# Patient Record
Sex: FEMALE | Race: ASIAN | Employment: PART TIME | URBAN - METROPOLITAN AREA
[De-identification: names, ages, dates, MRNs, and addresses within clinical notes are randomized per-mention and may not be internally consistent; named-entity substitution may affect disease eponyms.]

---

## 2019-06-03 ENCOUNTER — HOSPITAL ENCOUNTER (OUTPATIENT)
Dept: PHYSICAL THERAPY | Age: 54
Discharge: HOME | End: 2019-06-03
Payer: COMMERCIAL

## 2019-06-03 PROCEDURE — 97161 PT EVAL LOW COMPLEX 20 MIN: CPT | Performed by: PHYSICAL THERAPIST

## 2019-06-03 PROCEDURE — 97110 THERAPEUTIC EXERCISES: CPT | Performed by: PHYSICAL THERAPIST

## 2019-06-03 NOTE — PROGRESS NOTES
[facility] at [address] [place] [address] Drive: [phone]   Fax: [phone]  PLAN OF CARE / STATEMENT OF MEDICAL NECESSITY FOR PHYSICAL THERAPY SERVICES  Patient Name: Wagner Taylor : 1965   Medical   Diagnosis: Pain in right shoulder [M25.511] Treatment Diagnosis: Right shoulder pain   Onset Date: 2-3months ago     Referral Source: Shelley Xie MD Start of Care [facility]): 6/3/2019   Prior Hospitalization: See medical history Provider #: [id number]   Prior Level of Function: sedentary   Comorbidities: DMII, hypothyroidism   Medications: Verified on Patient Summary List   The Plan of Care and following information is based on the information from the initial evaluation.   ===========================================================================================  Assessment / gao information:  Wagner Taylor is a 62 y.o.  yo female presents with signs/symtoms of right frozen shoulder. Differential diagnosis includes RC tendinopathy, RC tear. Minimal to no response to isometrics for pain reduction/movement improvement.     Patient will continue to benefit from skilled PT services to modify and progress therapeutic interventions, address functional mobility deficits, address ROM deficits, address strength deficits, analyze and address soft tissue restrictions, analyze and cue movement patterns, analyze and modify body mechanics/ergonomics and assess and modify postural abnormalities to attain remaining goals.     Pt instructed in HEP and will f/u in clinic for PT.  ===========================================================================================  Eval Complexity: History MEDIUM  Complexity : 1-2 comorbidities / personal factors will impact the outcome/ POC ;  Examination  MEDIUM Complexity : 3 Standardized tests and measures addressing body structure, function, activity limitation and / or participation in recreation ; Presentation LOW Complexity : Stable, uncomplicated ;  Decision Making MEDIUM Complexity : FOTO score of 26-74; Overall Complexity LOW   Problem List: pain affecting function, decrease ROM, decrease strength, edema affecting function, impaired gait/ balance, decrease ADL/ functional abilitiies, decrease activity tolerance and decrease flexibility/ joint mobility   Treatment Plan may include any combination of the following: Therapeutic exercise, Therapeutic activities, Neuromuscular re-education, Physical agent/modality, Gait/balance training, Manual therapy, Patient education, Self Care training and Functional mobility training  Patient / Family readiness to learn indicated by: asking questions, trying to perform skills and interest  Persons(s) to be included in education: patient (P)  Barriers to Learning/Limitations: no  Patient Goal (s): No pain, better motion   Patient self reported health status: good  Rehabilitation Potential: fair  Goals:    Short Term Goals: To be accomplished in 2 weeks:   Patient will report compliance with HEP at least 1x/day to aid in rehabilitation program.   Status at IE: NA   Current: Same as IE     Patient will display pain free AROM into 0-130 to aid in completion of ADLs. Status at IE: pain to 130   Current: Same as IE      Long Term Goals: To be accomplished in 6 weeks:   Patient will report compliance with HEP a least 3-4x/week to aid in rehabilitation/strengthening program.   Status at IE: NA   Current: Same as IE     Patient will report no pain greater than 1-2/10 with overhead activities to aid in completion of ADLs. Status at IE: 2-8/10   Current: Same as IE     Patient will increase B UE strength to 5/5 throughout all planes to aid in completion of ADLs. Status at IE: varies, 4-/5   Current: Same as IE     Patient will increase FOTO score to 73 points overall to demonstrate improvement in functional status.     Status at IE: 77   Current: Same as IE        Frequency / Duration:   Patient to be seen 1 times per week for 8  weeks:  Patient / Caregiver education and instruction: self care and exercises      . Therapist Signature: Josh Linares DPT, OCS Date: 3/1/4915   Certification Period: NA Time: 10:02 AM   ===========================================================================================  I certify that the above Physical Therapy Services are being furnished while the patient is under my care. I agree with the treatment plan and certify that this therapy is necessary. Physician Signature:        Date:       Time:     Please sign and return to [facility] or you may fax the signed copy to [phone]. Thank you.

## 2019-06-03 NOTE — PROGRESS NOTES
PT DAILY TREATMENT NOTE/SHOULDER EVAL 10-18    Patient Name: Cleo Patel  Date:6/3/2019  : 1965  [x]  Patient  Verified  Payor: Ever Rodriguez / Plan: Felisa Tavera / Product Type: HMO /    In time:9:00  Out time:9:45  Total Treatment Time (min): 45  Visit #: 1 of 6    Medicare/BCBS Only   Total Timed Codes (min):  25 1:1 Treatment Time:  45       Treatment Area: Pain in right shoulder [M25.511]    SUBJECTIVE  Pain Level (0-10 scale): 2  []constant []intermittent []improving []worsening []no change since onset    Any medication changes, allergies to medications, adverse drug reactions, diagnosis change, or new procedure performed?: [x] No    [] Yes (see summary sheet for update)  Subjective functional status/changes:     Patient has c/c of right shoulder pain since 2-3months ago. MALOU: uncertain. Patient describes pain as sharp with movement, dull otherwise constant. .No diurnal features. Denies numbness/tingling. Denies popping/clicking. Aggravating factors: jerky movement, lifting, raising overhead. Alleviating factors: advil (on cccasion). Denies red flags: SOB, chest pain, dizziness/lightheadedness, blurred/double vision, HA, chills/fevers, night sweats, change in bowel/bladder control, abdominal pain, difficulty swallowing, slurred speech, unexplained weight gain/loss, nausea, vomiting. PMHx: DMII, hypothyroidism. Surgical Hx: unremarkable. Social Hx:  home, denies alcohol & tobacco, work status: . PLOF: sedentary, house work. CLOF: same.   Diagnostic Imaging: x-ray: unremarkable      OBJECTIVE/EXAMINATION    20 min [x]Eval                  []Re-Eval       25 min Therapeutic Exercise:  [x] See flow sheet :   Rationale: increase ROM, increase strength and decrease pain to improve the patients ability to complete ADLs            With   [] TE   [] TA   [] neuro   [] other: Patient Education: [x] Review HEP    [] Progressed/Changed HEP based on:   [] positioning   [] body mechanics   [] transfers [] heat/ice application    [] other:        Physical Therapy Evaluation - Shoulder    Observation: unremarkable  Posture: [] Poor    [x] Fair    [] Good    Describe:    ROM:  [] Unable to assess at this time                                           AROM                                                              PROM   Left Right  Left Right   Flexion 180 130compensation Flexion  90   Extension 70 45 Extension     Scaption/ 85 Scaptin/ABD  85   ER @ 0 Degrees   ER @ 0 Degrees     Apley IR T12 Posterior iliac crest ER @ 90 Degrees  30   Apley ER   IR @ 90 Degrees  15     End Feel / Painful Arc:    UE Strength:   [] Unable to assess at this time                                                                            L (1-5) R (1-5) Pain   Flexion 4 4- [] Yes   [] No   Abduction 4+ 3- [] Yes   [] No   ER 4 4- [] Yes   [] No   IR 5 4+ [] Yes   [] No   Extension 5 5 [] Yes   [] No   Elbow flexion 4+ 4 [] Yes   [] No   Elbow Ext 4+ 4 [] Yes   [] No   Wrist Flexion 5 5 [] Yes   [] No   Wrist Extension 5 5 [] Yes   [] No   Thumb extension 4 4 [] Yes   [] No   Finger Abduction 4 4 [] Yes   [] No       Scapulohumoral Control / Rhythm:  Able to eccentrically lower with good control? Left: [] Yes   [] No     Right: [] Yes   [] No    Accessory Motions:    Palpation  [] Min  [] Mod  [] Severe    Location:  [] Min  [] Mod  [] Severe    Location:      Other Tests / Comments:        Pain Level (0-10 scale) post treatment: 2     ASSESSMENT/Changes in Function: Patient presents with signs/symtoms of right frozen shoulder. Differential diagnosis includes RC tendinopathy, RC tear. Minimal to no response to isometrics for pain reduction/movement improvement.     Patient will continue to benefit from skilled PT services to modify and progress therapeutic interventions, address functional mobility deficits, address ROM deficits, address strength deficits, analyze and address soft tissue restrictions, analyze and cue movement patterns, analyze and modify body mechanics/ergonomics and assess and modify postural abnormalities to attain remaining goals.      [x]  See Plan of Care  []  See progress note/recertification  []  See Discharge Summary         Progress towards goals / Updated goals:  See POC    PLAN  []  Upgrade activities as tolerated     [x]  Continue plan of care  []  Update interventions per flow sheet       []  Discharge due to:_  []  Other:_      Tanesha Damon PT, DPT 6/3/2019  8:51 AM

## 2019-06-06 ENCOUNTER — HOSPITAL ENCOUNTER (OUTPATIENT)
Dept: PHYSICAL THERAPY | Age: 54
Discharge: HOME | End: 2019-06-06
Payer: COMMERCIAL

## 2019-06-06 PROCEDURE — 97110 THERAPEUTIC EXERCISES: CPT | Performed by: PHYSICAL THERAPIST

## 2019-06-06 NOTE — PROGRESS NOTES
PT DAILY TREATMENT NOTE    Patient Name: Anthony Aguilar  Date:2019  : 1965  [x]  Patient  Verified  Payor: Liberty Forrest / Plan: Ellen Angel / Product Type: HMO /    In time:9:00  Out time:9:30  Total Treatment Time (min): 30  Total Timed Codes (min): 30  1:1 Treatment Time ( only): 30   Visit #: 2 of 8    Treatment Area: Pain in right shoulder [M25.511]    SUBJECTIVE  Pain Level (0-10 scale): 0  Any medication changes, allergies to medications, adverse drug reactions, diagnosis change, or new procedure performed?: [x] No    [] Yes (see summary sheet for update)  Subjective functional status/changes:   [] No changes reported  Feels okay. No new issues. Patient is progressing well. OBJECTIVE    30 min Therapeutic Exercise:  [x] See flow sheet :   Rationale: increase ROM, increase strength and decrease pain to improve the patients ability to complete ADLs    With   [] TE   [] TA   [] neuro   [] other: Patient Education: [x] Review HEP    [] Progressed/Changed HEP based on:   [] positioning   [] body mechanics   [] transfers   [] heat/ice application    [] other:      Other Objective/Functional Measures: NA     Pain Level (0-10 scale) post treatment: 2    ASSESSMENT/Changes in Function: Patient responds well to treatment session. Patient is challenged with exercises as prescribed. Does demonstrate RC weakness with OH shoulder press.  . No adverse effects were noted from today's treatment session       Patient will continue to benefit from skilled PT services to modify and progress therapeutic interventions, address functional mobility deficits, address ROM deficits, address strength deficits, analyze and address soft tissue restrictions, analyze and cue movement patterns, analyze and modify body mechanics/ergonomics, assess and modify postural abnormalities     []  See Plan of Care  []  See progress note/recertification  []  See Discharge Summary         Progress towards goals / Updated goals:  Short Term Goals: To be accomplished in 2 weeks:                   Patient will report compliance with HEP at least 1x/day to aid in rehabilitation program.                   Status at IE: NA                   Current: Reports compliance, Met 6/6/2019                      Patient will display pain free AROM into 0-130 to aid in completion of ADLs. Status at IE: pain to 130                   Current: Same as IE        Long Term Goals: To be accomplished in 6 weeks:                   Patient will report compliance with HEP a least 3-4x/week to aid in rehabilitation/strengthening program.                   Status at IE: NA                   Current: Same as IE                      Patient will report no pain greater than 1-2/10 with overhead activities to aid in completion of ADLs. Status at IE: 2-8/10                   Current: Same as IE                      Patient will increase B UE strength to 5/5 throughout all planes to aid in completion of ADLs. Status at IE: varies, 4-/5                   Current: Same as IE                      Patient will increase FOTO score to 73 points overall to demonstrate improvement in functional status.                     Status at IE: 77                   Current: Same as IE        PLAN  []  Upgrade activities as tolerated     [x]  Continue plan of care  []  Update interventions per flow sheet       []  Discharge due to:_  []  Other:_      Tanesha Damon, PT, DPT 6/6/2019  9:12 AM    Future Appointments   Date Time Provider Deja Lambert   6/12/2019  8:00 AM [facility]

## 2019-06-12 ENCOUNTER — HOSPITAL ENCOUNTER (OUTPATIENT)
Dept: PHYSICAL THERAPY | Age: 54
Discharge: HOME | End: 2019-06-12
Payer: COMMERCIAL

## 2019-06-12 PROCEDURE — 97110 THERAPEUTIC EXERCISES: CPT | Performed by: PHYSICAL THERAPIST

## 2019-06-12 NOTE — PROGRESS NOTES
PT DAILY TREATMENT NOTE    Patient Name: Lizzie Fabian  Date:2019  : 1965  [x]  Patient  Verified  Payor: Micki Watkins / Plan: Ramses Westfall / Product Type: HMO /    In time:8:00  Out time:8:30  Total Treatment Time (min): 30  Total Timed Codes (min): 30  1:1 Treatment Time ( only): 30   Visit #: 3 of 8    Treatment Area: Pain in right shoulder [M25.511]    SUBJECTIVE  Pain Level (0-10 scale): 1  Any medication changes, allergies to medications, adverse drug reactions, diagnosis change, or new procedure performed?: [x] No    [] Yes (see summary sheet for update)  Subjective functional status/changes:   [] No changes reported  Feels okay. Was a little sore after last visit, but it's better now. OBJECTIVE    30 min Therapeutic Exercise:  [x] See flow sheet :   Rationale: increase ROM, increase strength and decrease pain to improve the patients ability to complete ADLs    With   [] TE   [] TA   [] neuro   [] other: Patient Education: [x] Review HEP    [] Progressed/Changed HEP based on:   [] positioning   [] body mechanics   [] transfers   [] heat/ice application    [] other:      Other Objective/Functional Measures: NA     Pain Level (0-10 scale) post treatment: 0    ASSESSMENT/Changes in Function: Patient responds well to treatment session. Patient has minimal difficulty with exercises as prescribed. Patient requires VC and tactile cues for triceps ext. . No adverse effects were noted from today's treatment session       Patient will continue to benefit from skilled PT services to modify and progress therapeutic interventions, address functional mobility deficits, address ROM deficits, address strength deficits, analyze and address soft tissue restrictions, analyze and cue movement patterns, analyze and modify body mechanics/ergonomics, assess and modify postural abnormalities    []  See Plan of Care  []  See progress note/recertification  []  See Discharge Summary         Progress towards goals / Updated goals:  Short Term Goals: To be accomplished in 2 weeks:                   [id number] will report compliance with HEP at least 1x/day to aid in rehabilitation program.                   Status at IE: NA                   Current: Reports compliance, Met 6/6/2019                      Patient will display pain free AROM into 0-130 to aid in completion of ADLs.                  Status at IE: pain to 130                   Current: AROM 0-145 flex, met 6/12/2019        Long Term Goals: To be accomplished in 6 weeks:                   Patient will report compliance with HEP a least 3-4x/week to aid in rehabilitation/strengthening program.                   Status at IE: NA                   Current: Same as IE                      Patient will report no pain greater than 1-2/10 with overhead activities to aid in completion of ADLs.                  Status at IE: 2-8/10                   Current: Same as IE                      Patient will increase B UE strength to 5/5 throughout all planes to aid in completion of ADLs.                  Status at IE: varies, 4-/5                   Current: Same as IE                      Patient will increase FOTO score to 73 points overall to demonstrate improvement in functional status.                  Status at IE: 77                   Current: Same as IE        PLAN  []  Upgrade activities as tolerated     [x]  Continue plan of care  []  Update interventions per flow sheet       []  Discharge due to:_  []  Other:_      Liborio Bourgeois PT, DPT 6/12/2019  8:04 AM    No future appointments.

## 2019-06-19 ENCOUNTER — HOSPITAL ENCOUNTER (OUTPATIENT)
Dept: PHYSICAL THERAPY | Age: 54
Discharge: HOME | End: 2019-06-19
Payer: COMMERCIAL

## 2019-06-19 PROCEDURE — 97110 THERAPEUTIC EXERCISES: CPT | Performed by: PHYSICAL THERAPIST

## 2019-06-19 NOTE — PROGRESS NOTES
PT DAILY TREATMENT NOTE    Patient Name: Cecilia Cook  Date:2019  : 1965  [x]  Patient  Verified  Payor: Hermelindo Martínez / Plan: Je Callahan / Product Type: HMO /    In time:8:30  Out time:8:57  Total Treatment Time (min): 27  Total Timed Codes (min): 27  1:1 Treatment Time ( only): 27   Visit #: 4 of 8    Treatment Area: Pain in right shoulder [M25.511]    SUBJECTIVE  Pain Level (0-10 scale): 0  Any medication changes, allergies to medications, adverse drug reactions, diagnosis change, or new procedure performed?: [x] No    [] Yes (see summary sheet for update)  Subjective functional status/changes:   [] No changes reported  Feels good. No new issues. OBJECTIVE    27 min Therapeutic Exercise:  [x] See flow sheet :   Rationale: increase ROM, increase strength and decrease pain to improve the patients ability to complete ADLs  Access Code: [email or web address]   URL: [email or web address]   Date: 2019   Prepared by: Liborio Armor     Exercises   Split Stance Shoulder Row with Resistance - 10 reps - 2 sets - 3x weekly   Shoulder Extension with Resistance - 10 reps - 2 sets - 3x weekly   Standing Shoulder Horizontal Abduction with Resistance - 10 reps - 2 sets - 3x weekly   Shoulder Overhead Press in Abduction with Dumbbells - 10 reps - 2 sets - 3x weekly   Standing Bicep Curls Supinated with Dumbbells - 10 reps - 2 sets - 3x weekly   Standing Elbow Extension with Anchored Resistance - 10 reps - 2 sets - 3x weekly   Shoulder Internal Rotation with Resistance - 10 reps - 2 sets - 3x weekly     With   [] TE   [] TA   [] neuro   [] other: Patient Education: [x] Review HEP    [] Progressed/Changed HEP based on:   [] positioning   [] body mechanics   [] transfers   [] heat/ice application    [] other:      Other Objective/Functional Measures: NA     Pain Level (0-10 scale) post treatment: 0    ASSESSMENT/Changes in Function: Patient responds well to treatment session.  Requires cueing for [facility] shoulder press. Responds well, but requires change in sets/reps range to allow for more appropriate work load. . No adverse effects were noted from today's treatment session       Patient will continue to benefit from skilled PT services to modify and progress therapeutic interventions, address functional mobility deficits, address ROM deficits, address strength deficits, analyze and address soft tissue restrictions, analyze and cue movement patterns, analyze and modify body mechanics/ergonomics, assess and modify postural abnormalities    []  See Plan of Care  []  See progress note/recertification  []  See Discharge Summary         Progress towards goals / Updated goals:  Short Term Goals: To be accomplished in 2 weeks:                   Patient will report compliance with HEP at least 1x/day to aid in rehabilitation program.                   Status at IE: NA                   Current: Reports compliance, Met 6/6/2019                      Patient will display pain free AROM into 0-130 to aid in completion of ADLs.                  Status at IE: pain to 130                   Current: AROM 0-145 flex, met 6/12/2019        Long Term Goals: To be accomplished in 6 weeks:                   Patient will report compliance with HEP a least 3-4x/week to aid in rehabilitation/strengthening program.                   Status at IE: NA                   Current: Same as IE                      Patient will report no pain greater than 1-2/10 with overhead activities to aid in completion of ADLs.                  Status at IE: 2-8/10                   Current: 0/10 pain, Met 6/19/2019                      Patient will increase B UE strength to 5/5 throughout all planes to aid in completion of ADLs.                  Status at IE: varies, 4-/5                   Current: Same as IE                      Patient will increase FOTO score to 73 points overall to demonstrate improvement in functional status.                     Status at IE: 77                   Current: Same as IE        PLAN  []  Upgrade activities as tolerated     [x]  Continue plan of care  []  Update interventions per flow sheet        []  Discharge due to:_  []  Other:_      Elmira Porter PT, DPT 6/19/2019  8:42 AM    Future Appointments   Date Time Provider Deja Lambert   6/26/2019  8:30 AM June Duet, PT, DPT [facility]   7/3/2019  8:30 AM June Duet, PT, DPT [facility]   7/10/2019  8:30 AM June Duet, PT, DPT [facility]

## 2019-06-26 ENCOUNTER — HOSPITAL ENCOUNTER (OUTPATIENT)
Dept: PHYSICAL THERAPY | Age: 54
Discharge: HOME | End: 2019-06-26
Payer: COMMERCIAL

## 2019-06-26 PROCEDURE — 97110 THERAPEUTIC EXERCISES: CPT | Performed by: PHYSICAL THERAPIST

## 2019-06-26 NOTE — PROGRESS NOTES
[facility] PT DAILY TREATMENT NOTE    Patient Name: Mauricio Emerson  Date:2019  : 1965  [x]  Patient  Verified  Payor: Audra Chavez / Plan: William Treviño / Product Type: HMO /    In time:8:30  Out time:8:59  Total Treatment Time (min): 29  Total Timed Codes (min): 29  1:1 Treatment Time ( only): 29   Visit #: 5 of 8    Treatment Area: Pain in right shoulder [M25.511]    SUBJECTIVE  Pain Level (0-10 scale): 0  Any medication changes, allergies to medications, adverse drug reactions, diagnosis change, or new procedure performed?: [x] No    [] Yes (see summary sheet for update)  Subjective functional status/changes:   [] No changes reported  Feels okay. No pain, but still limited with AROM    OBJECTIVE    29 min Therapeutic Exercise:  [x] See flow sheet :   Rationale: increase ROM, increase strength and decrease pain to improve the patients ability to complete ADLs    Access Code: [email or web address]   URL: [employer]/   Date: 2019   Prepared by: Zaid Ayala     Exercises   Split Stance Shoulder Row with Resistance - 10 reps - 2 sets - 3x weekly   Shoulder Extension with Resistance - 10 reps - 2 sets - 3x weekly   Standing Shoulder Horizontal Abduction with Resistance - 10 reps - 2 sets - 3x weekly   Shoulder Overhead Press in Abduction with Dumbbells - 10 reps - 2 sets - 3x weekly   Standing Bicep Curls Supinated with Dumbbells - 10 reps - 2 sets - 3x weekly   Standing Elbow Extension with Anchored Resistance - 10 reps - 2 sets - 3x weekly   Shoulder Internal Rotation with Resistance - 10 reps - 2 sets - 3x weekly   Shoulder External Rotation with Anchored Resistance - 10 reps - 2 sets - 3x weekly      With   [] TE   [] TA   [] neuro   [] other: Patient Education: [x] Review HEP    [] Progressed/Changed HEP based on:   [] positioning   [] body mechanics   [] transfers   [] heat/ice application    [] other:      Other Objective/Functional Measures: NA     Pain Level (0-10 scale) post treatment: 0    ASSESSMENT/Changes in Function: Patient responds well to treatment session. Patient is making good progress. Pain is reduced and she is able to perform exercises with greater resistance. Still limited with AROM as expected. Will progress as tolerated. No adverse effects were noted from today's treatment session       Patient will continue to benefit from skilled PT services to modify and progress therapeutic interventions, address functional mobility deficits, address ROM deficits, address strength deficits, analyze and address soft tissue restrictions, analyze and cue movement patterns, analyze and modify body mechanics/ergonomics, assess and modify postural abnormalities    []  See Plan of Care  []  See progress note/recertification  []  See Discharge Summary         Progress towards goals / Updated goals:  Short Term Goals: To be accomplished in 2 weeks:                   Patient will report compliance with HEP at least 1x/day to aid in rehabilitation program.                   Status at IE: NA                   Current: Reports compliance, Met 6/6/2019                      Patient will display pain free AROM into 0-130 to aid in completion of ADLs.                  Status at IE: pain to 130                   Current: AROM 0-145 flex, met 6/12/2019        Long Term Goals: To be accomplished in 6 weeks:                   Patient will report compliance with HEP a least 3-4x/week to aid in rehabilitation/strengthening program.                   Status at IE: NA                   Current: Same as IE                      Patient will report no pain greater than 1-2/10 with overhead activities to aid in completion of ADLs.                  Status at IE: 2-8/10                   Current: 0/10 pain, Met 6/19/2019                      Patient will increase B UE strength to 5/5 throughout all planes to aid in completion of ADLs.                    Status at IE: varies, 4-/5                   Current: 4/5 grossly, progressing 6/26/2019                       Patient will increase FOTO score to 73 points overall to demonstrate improvement in functional status.                     Status at IE: 77                   Current: Same as IE        PLAN  []  Upgrade activities as tolerated     [x]  Continue plan of care  []  Update interventions per flow sheet       []  Discharge due to:_  []  Other:_      Brennen Gudino PT, DPT 6/26/2019  8:40 AM    Future Appointments   Date Time Provider Deja Lambert   7/3/2019  8:30 AM Veto Vega PT, DPT [facility]   7/10/2019  8:30 AM Veto Vega PT, DPT [facility]

## 2019-07-03 ENCOUNTER — HOSPITAL ENCOUNTER (OUTPATIENT)
Dept: PHYSICAL THERAPY | Age: 54
Discharge: HOME | End: 2019-07-03
Payer: COMMERCIAL

## 2019-07-03 PROCEDURE — 97110 THERAPEUTIC EXERCISES: CPT | Performed by: PHYSICAL THERAPIST

## 2019-07-03 NOTE — PROGRESS NOTES
PT DAILY TREATMENT NOTE    Patient Name: Linh Worthington  Date:7/3/2019  : 1965  [x]  Patient  Verified  Payor: Akil Jarquin / Plan: Raquel Mills / Product Type: HMO /    In time:8:35  Out time:9:03  Total Treatment Time (min): 28  Total Timed Codes (min): 28  1:1 Treatment Time ( only): 28   Visit #: 6 of 8    Treatment Area: Pain in right shoulder [M25.511]    SUBJECTIVE  Pain Level (0-10 scale): 0  Any medication changes, allergies to medications, adverse drug reactions, diagnosis change, or new procedure performed?: [x] No    [] Yes (see summary sheet for update)  Subjective functional status/changes:   [] No changes reported  Feels a little sore because she was doing extra work at the house    OBJECTIVE    28 min Therapeutic Exercise:  [x] See flow sheet :   Rationale: increase ROM, increase strength and decrease pain to improve the patients ability to complete ADLs    With   [] TE   [] TA   [] neuro   [] other: Patient Education: [x] Review HEP    [] Progressed/Changed HEP based on:   [] positioning   [] body mechanics   [] transfers   [] heat/ice application    [] other:      Other Objective/Functional Measures: NA     Pain Level (0-10 scale) post treatment: 0    ASSESSMENT/Changes in Function: Patient responds well to treatment session. Patient is making good progress, but continues to have strength deficits.  . No adverse effects were noted from today's treatment session       Patient will continue to benefit from skilled PT services to modify and progress therapeutic interventions, address functional mobility deficits, address ROM deficits, address strength deficits, analyze and address soft tissue restrictions, analyze and cue movement patterns, analyze and modify body mechanics/ergonomics, assess and modify postural abnormalities    []  See Plan of Care  []  See progress note/recertification  []  See Discharge Summary         Progress towards goals / Updated goals:  Short Term Goals: To be accomplished in 2 weeks:                   Patient will report compliance with HEP at least 1x/day to aid in rehabilitation program.                   Status at IE: NA                   Current: Reports compliance, Met 6/6/2019                      Patient will display pain free AROM into 0-130 to aid in completion of ADLs.                  Status at IE: pain to 130                   Current: AROM 0-145 flex, met 6/12/2019        Long Term Goals: To be accomplished in 6 weeks:                   Patient will report compliance with HEP a least 3-4x/week to aid in rehabilitation/strengthening program.                   Status at IE: NA                   Current: Same as IE                      Patient will report no pain greater than 1-2/10 with overhead activities to aid in completion of ADLs.                  Status at IE: 2-8/10                   Current: 0/10 pain, Met 6/19/2019                      Patient will increase B UE strength to 5/5 throughout all planes to aid in completion of ADLs.                  Status at IE: varies, 4-/5                   Current: 4/5 grossly, progressing 6/26/2019                       Patient will increase FOTO score to 73 points overall to demonstrate improvement in functional status.                     Status at IE: 77                   Current: Same as IE        PLAN  []  Upgrade activities as tolerated     [x]  Continue plan of care  []  Update interventions per flow sheet       []  Discharge due to:_  []  Other:_      Mary Cummings, PT, DPT 7/3/2019  8:53 AM    Future Appointments   Date Time Provider Deja Lambert   7/10/2019  8:30 AM Ju Mahoney, PT, DPT [facility]

## 2019-07-10 ENCOUNTER — HOSPITAL ENCOUNTER (OUTPATIENT)
Dept: PHYSICAL THERAPY | Age: 54
Discharge: HOME | End: 2019-07-10
Payer: COMMERCIAL

## 2019-07-10 PROCEDURE — 97110 THERAPEUTIC EXERCISES: CPT | Performed by: PHYSICAL THERAPIST

## 2019-07-10 NOTE — PROGRESS NOTES
[facility] at [address] [place] [address] Drive: [phone]   Fax: [phone]  Progress Note  Patient Name: Adriano Aranda : 1965   Medical   Diagnosis: Pain in right shoulder [M25.511] Treatment Diagnosis: Right shoulder pain   Onset Date: 2019     Referral Source: Zechariah Mcgee MD [facility] Care [facility]): 6/3/2019   Prior Hospitalization: See medical history Provider #: [id number]   Prior Level of Function: sedentary   Comorbidities: DMII, hypothyroidism   Medications: Verified on Patient Summary List      ===========================================================================================  Assessment / Summary of Care:  Adriano Aranda is a 62 y.o.  yo female with right frozen shoulder. Patient has made good progress to date. Although has continued limited ROM. Patient will be OOT and unavailable for the next several weeks. We will f/u after patients schedule clears. . No adverse effects were noted from today's treatment session    Patient will continue to benefit from skilled PT services to modify and progress therapeutic interventions, address functional mobility deficits, address ROM deficits, address strength deficits, analyze and address soft tissue restrictions, analyze and cue movement patterns, analyze and modify body mechanics/ergonomics, assess and modify postural abnormalities    ===========================================================================================    Plan:Continue therapy at a frequency of  1  x per week for 6 weeks    Goals:   Short Term Goals: To be accomplished in 2 weeks:                   Patient will report compliance with HEP at least 1x/day to aid in rehabilitation program.                   Status at IE: NA                   Current: Reports compliance, Met 2019                      Patient will display pain free AROM into 0-130 to aid in completion of ADLs.                    Status at IE: pain to 130                   Current: AROM 0-145 flex, met 6/12/2019        Long Term Goals: To be accomplished in 6 weeks:                   QGEOOYV will report compliance with HEP a least 3-4x/week to aid in rehabilitation/strengthening program.                   Status at IE: NA                   Current: reports compliance/ 7/03/2019                      Patient will report no pain greater than 1-2/10 with overhead activities to aid in completion of ADLs.                  Status at IE: 2-8/10                   Current: 0/10 pain, Met 6/19/2019                      Patient will increase B UE strength to 5/5 throughout all planes to aid in completion of ADLs.                  Status at IE: varies, 4-/5                   Current: 4/5 grossly, progressing 6/26/2019                       Patient will increase FOTO score to 73 points overall to demonstrate improvement in functional status.                  Status at IE: 77                   Current: Same as IE        ===========================================================================================  Subjective: Feels okay. Just a little bit of soreness today from doing to much around the house    Objective:   UE Strength:   [] Unable to assess at this time                                                                             L (1-5) R (1-5) Pain   Flexion 4 4+ [] Yes   [] No   Abduction 4 4- [] Yes   [] No   ER 5 5 [] Yes   [] No   IR 5 4 [] Yes   [] No   Extension 5 5 [] Yes   [] No   Elbow flexion 5 5 [] Yes   [] No   Elbow Ext 5 5 [] Yes   [] No   Wrist Flexion 5 5 [] Yes   [] No   Wrist Extension 5 5 [] Yes   [] No   Thumb extension     [] Yes   [] No   Finger Abduction     [] Yes   [] No                            Therapist Signature: Brennen Rodas PT, DPT, OCS Date: 7/64/4540   Re-Certification:  Time: 4:06 PM       I certify that the above Therapy Services are being furnished while the patient is under my care.  I agree with the treatment plan and certify that this therapy is necessary. [] I have read the above and request that my patient continue as recommended. [] I have read the above report and request that my patient continue therapy with the following changes/special instructions: ______________________________________  [] I have read the above report and request that my patient be discharged from therapy    Physician's Signature:____________Date:_________TIME:________    ** Signature, Date and Time must be completed for valid certification **    [facility] at [address]         Phone: [phone]   Fax: [phone]  .

## 2019-07-10 NOTE — PROGRESS NOTES
PT DAILY TREATMENT NOTE    Patient Name: Zeyad Andrade  Date:7/10/2019  : 1965  [x]  Patient  Verified  Payor: Jakob Hernandez / Plan: Jordan Hermilo / Product Type: HMO /    In time:8:35  Out time:9:10  Total Treatment Time (min): 35  Total Timed Codes (min): 35  1:1 Treatment Time ( [address] only): 35   Visit #: 7 of 12    Treatment Area: Pain in right shoulder [M25.511]    SUBJECTIVE  Pain Level (0-10 scale): 2  Any medication changes, allergies to medications, adverse drug reactions, diagnosis change, or new procedure performed?: [x] No    [] Yes (see summary sheet for update)  Subjective functional status/changes:   [] No changes reported  Feels okay. Just a little bit of soreness today from doing to much around the house    OBJECTIVE    35 min Therapeutic Exercise:  [x] See flow sheet :   Rationale: increase ROM, increase strength and decrease pain to improve the patients ability to complete ADLs    With   [] TE   [] TA   [] neuro   [] other: Patient Education: [x] Review HEP    [] Progressed/Changed HEP based on:   [] positioning   [] body mechanics   [] transfers   [] heat/ice application    [] other:      Other Objective/Functional Measures:     UE Strength:   [] Unable to assess at this time                                                                            L (1-5) R (1-5) Pain   Flexion 4 4+ [] Yes   [] No   Abduction 4 4- [] Yes   [] No   ER 5 5 [] Yes   [] No   IR 5 4 [] Yes   [] No   Extension 5 5 [] Yes   [] No   Elbow flexion 5 5 [] Yes   [] No   Elbow Ext 5 5 [] Yes   [] No   Wrist Flexion 5 5 [] Yes   [] No   Wrist Extension 5 5 [] Yes   [] No   Thumb extension   [] Yes   [] No   Finger Abduction   [] Yes   [] No          Pain Level (0-10 scale) post treatment: 0    ASSESSMENT/Changes in Function: Patient responds well to treatment session. Patient has made good progress to date. Although has continued limited ROM. Patient will be OOT and unavailable for the next several weeks.  We will f/u after patients schedule clears. . No adverse effects were noted from today's treatment session       Patient will continue to benefit from skilled PT services to modify and progress therapeutic interventions, address functional mobility deficits, address ROM deficits, address strength deficits, analyze and address soft tissue restrictions, analyze and cue movement patterns, analyze and modify body mechanics/ergonomics, assess and modify postural abnormalities    []  See Plan of Care  [x]  See progress note/recertification  []  See Discharge Summary         Progress towards goals / Updated goals:  Short Term Goals: To be accomplished in 2 weeks:                   Patient will report compliance with HEP at least 1x/day to aid in rehabilitation program.                   Status at IE: NA                   Current: Reports compliance, Met 6/6/2019                      Patient will display pain free AROM into 0-130 to aid in completion of ADLs.                  Status at IE: pain to 130                   Current: AROM 0-145 flex, met 6/12/2019        Long Term Goals: To be accomplished in 6 weeks:                   Patient will report compliance with HEP a least 3-4x/week to aid in rehabilitation/strengthening program.                   Status at IE: NA                   Current: reports compliance/ 7/03/2019                      Patient will report no pain greater than 1-2/10 with overhead activities to aid in completion of ADLs.                  Status at IE: 2-8/10                   Current: 0/10 pain, Met 6/19/2019                      Patient will increase B UE strength to 5/5 throughout all planes to aid in completion of ADLs.                  Status at IE: varies, 4-/5                   Current: 4/5 grossly, progressing 6/26/2019                       Patient will increase FOTO score to 73 points overall to demonstrate improvement in functional status.                     Status at IE: 77                   Current: Same as IE        PLAN  []  Upgrade activities as tolerated     [x]  Continue plan of care  []  Update interventions per flow sheet       []  Discharge due to:_  []  Other:_      Philipp Valle, PT, DPT 7/10/2019  8:43 AM    No future appointments.

## 2019-08-01 ENCOUNTER — APPOINTMENT (OUTPATIENT)
Dept: PHYSICAL THERAPY | Age: 54
End: 2019-08-01

## 2019-08-16 NOTE — PROGRESS NOTES
[facility] at [address] [place] [address] Drive: [phone]   Fax: [phone]  Discharge Summary  Patient Name: Mirtha Clifton : 1965   Medical   Diagnosis: Pain in right shoulder [M25.511] Treatment Diagnosis: Right shoulder pain   Onset Date: 2019     Referral Source: Reny Lucas MD [facility] 7/3/2019   Prior Hospitalization: See medical history Provider #: [id number]   Prior Level of Function: sedentary   Comorbidities: DMII, hypothyroidism   Medications: Verified on Patient Summary List      ===========================================================================================  Assessment / Summary of Care:  Mirtha Clifton is a 62 y.o.  yo female with right shoulder pain. Patient has not returned to clinic in 30 days. Unable to perform formal assessment on patient as a result.     ===========================================================================================    Plan: Discharge to [facility].     Goals: Unable to reassess.    ===========================================================================================  Subjective: NA      Objective: NA    Therapist Signature: Jae Reyes PT, DPT, OCS Date: 2019     Time: 2:56 PM       \